# Patient Record
Sex: MALE | Employment: FULL TIME | URBAN - METROPOLITAN AREA
[De-identification: names, ages, dates, MRNs, and addresses within clinical notes are randomized per-mention and may not be internally consistent; named-entity substitution may affect disease eponyms.]

---

## 2024-09-19 ENCOUNTER — OFFICE VISIT (OUTPATIENT)
Dept: OTOLARYNGOLOGY | Facility: CLINIC | Age: 24
End: 2024-09-19
Payer: COMMERCIAL

## 2024-09-19 ENCOUNTER — EVALUATION (OUTPATIENT)
Dept: SPEECH THERAPY | Facility: CLINIC | Age: 24
End: 2024-09-19
Payer: COMMERCIAL

## 2024-09-19 VITALS — WEIGHT: 200 LBS | HEIGHT: 72 IN | TEMPERATURE: 97.1 F | BODY MASS INDEX: 27.09 KG/M2

## 2024-09-19 DIAGNOSIS — R49.8 OTHER VOICE AND RESONANCE DISORDERS: ICD-10-CM

## 2024-09-19 DIAGNOSIS — R49.0 DYSPHONIA: Primary | ICD-10-CM

## 2024-09-19 DIAGNOSIS — J38.3 VOCAL CORD ULCER: ICD-10-CM

## 2024-09-19 DIAGNOSIS — R49.0 MUSCLE TENSION DYSPHONIA: ICD-10-CM

## 2024-09-19 DIAGNOSIS — K21.9 LARYNGOPHARYNGEAL REFLUX (LPR): ICD-10-CM

## 2024-09-19 DIAGNOSIS — R09.A2 GLOBUS PHARYNGEUS: ICD-10-CM

## 2024-09-19 DIAGNOSIS — R49.9 CHANGE IN VOICE: Primary | ICD-10-CM

## 2024-09-19 DIAGNOSIS — M54.2 NECK PAIN: ICD-10-CM

## 2024-09-19 PROCEDURE — 99204 OFFICE O/P NEW MOD 45 MIN: CPT | Performed by: OTOLARYNGOLOGY

## 2024-09-19 PROCEDURE — 31579 LARYNGOSCOPY TELESCOPIC: CPT | Performed by: OTOLARYNGOLOGY

## 2024-09-19 PROCEDURE — 3008F BODY MASS INDEX DOCD: CPT | Performed by: OTOLARYNGOLOGY

## 2024-09-19 PROCEDURE — 92524 BEHAVRAL QUALIT ANALYS VOICE: CPT | Mod: GN | Performed by: SPEECH-LANGUAGE PATHOLOGIST

## 2024-09-19 RX ORDER — DEXTROAMPHETAMINE SULFATE, DEXTROAMPHETAMINE SACCHARATE, AMPHETAMINE SULFATE AND AMPHETAMINE ASPARTATE 2.5; 2.5; 2.5; 2.5 MG/1; MG/1; MG/1; MG/1
1 CAPSULE, EXTENDED RELEASE ORAL
COMMUNITY
Start: 2024-08-23

## 2024-09-19 ASSESSMENT — PAIN - FUNCTIONAL ASSESSMENT: PAIN_FUNCTIONAL_ASSESSMENT: 0-10

## 2024-09-19 ASSESSMENT — PATIENT HEALTH QUESTIONNAIRE - PHQ9
SUM OF ALL RESPONSES TO PHQ9 QUESTIONS 1 AND 2: 0
1. LITTLE INTEREST OR PLEASURE IN DOING THINGS: NOT AT ALL
2. FEELING DOWN, DEPRESSED OR HOPELESS: NOT AT ALL

## 2024-09-19 ASSESSMENT — PAIN SCALES - GENERAL: PAINLEVEL_OUTOF10: 0 - NO PAIN

## 2024-09-19 NOTE — PROGRESS NOTES
ASSESSMENT AND PLAN:   Cooper Cross is a 24 y.o. male with a history of voice changes for ~ 1 month. He had an URI with cough that lead to his symptoms of pain in the left upper larynx with loud phonation and high pitch singing.       Today's examination to include stroboscopy. demonstrates an ulcer on the left medial arytenoid near the vocal process due to significant cough.     We discussed starting reflux mediation. We will consider steroids if he doesn't recover.     I would like to see the patient back on Monday 08/23/2024 @ 8:00 am to determine next steps. He will have a modified voice rest schedule and will see our SLP today.      Reason For Consult  Chief Complaint   Patient presents with    vocal issues     Difficulty swallowing, going on for a month         HISTORY OF PRESENT ILLNESS:  Cooper Cross is a 24 y.o. male presenting for a follow up visit with me for voice changes.  The patient is a lei.     The patient reports an URI with cough 4-5 weeks ago. He has noted pain in the throat with singing and swallowing. This is worse in the morning. He has noticed issues with loud phonation. There were no voice issues prior to this URI.            Past Medical History  He has no past medical history on file. Surgical History  He has no past surgical history on file.   Social History  He reports that he has quit smoking. His smoking use included cigarettes. He has never used smokeless tobacco. No history on file for alcohol use and drug use. Allergies  Patient has no known allergies.     Family History  No family history on file.    Review of Systems  All 10 systems were reviewed and negative except for above.      Last Recorded Vitals  Temperature 36.2 °C (97.1 °F), height 1.829 m (6'), weight 90.7 kg (200 lb).    Physical Exam  ENT Physical Exam  Constitutional  Appearance: patient appears well-developed and well-nourished,  Head and Face  Appearance: head appears normal and face appears  normal;  Ear  Auricles: right auricle normal; left auricle normal;  Nose  External Nose: nares patent bilaterally;  Internal Nose: nasal septal deviation present;  Oral Cavity/Oropharynx  Lips: normal;  Neck  Neck: neck normal; neck palpation normal;  Respiratory  Inspection: no retractions visible;  Cardiovascular  Inspection: no peripheral edema present;  Neurovestibular  Mental Status: alert and oriented;  Psychiatric: mood normal;  Cranial Nerves: cranial nerves intact;        Procedures     Flexible Laryngoscopy w/ Videostroboscopy    VOICE AND SPEECH CHARACTERISTICS:  Normal spoken speech, no dysphonia, no roughness, no breathiness, no asthenia, no strain.    Additional Voice Characteristics: glottal stovall  Intelligibility: normal.   Resonance: balanced.   Vocal Loudness: normal.   Breath Support: normal.    PROCEDURE:    Indications: voice change  PROCEDURE NOTE: FLEXIBLE LARYNGOSCOPY WITH STROBOSCOPY  I recommended a flexible laryngoscopy with stroboscopy based on PE findings, and/or concern for mucosal wave details based upon history and/or for issues associated with hyperreflexic gag on mirror exam concerning for pathology. Risks, benefits, and alternatives were explained. The patient wishes to proceed and gives verbal consent.   Patient is seated in the exam chair. After adequate topical anesthesia, I advance the flexible endoscope. The examination included evaluation of the singh, vallecula, base of tongue, pyriforms, post-cricoid area, larynx and immediate subglottis.  Findings : assessment of the nasopharynx, base of tongue/vallecula, pyriform sinuses, post-cricoid area and pharyngeal walls was without lesion or mass, pharyngeal wall contraction is normal and symmetric, and no pooling of secretions  Reflux finding score: 0/26  (>7 95% significant)  Gross Arytenoid Movement: symmetric.  Arytenoid Height: normal.   Supraglottic Tension: none.  Symmetry: normal.   Amplitude: normal.  Phase Closure:  in-phase.  Mucosal Wave Lateral Excursion/Secondary Wave: Bilateral Vocal Cord: no restriction - wave moved more than ½ the width of the vocal fold.  Periodicity: normal.  Mass: Left arytenoid ulcer.   Closure: closed.    Time Spent  Prep time on day of patient encounter: 10 minutes  Time spent directly with patient, family or caregiver: 15 minutes  Additional Time Spent on Patient Care Activities/Discussion with SLP re care plan: 5 minutes  Documentation Time: 10 minutes  Other Time Spent: 0 minutes  Total: 40 minutes     Scribe Attestation  By signing my name below, Lashon MONK , [employer] attest that this documentation has been prepared under the direction and in the presence of Mitch Whatley MD.

## 2024-09-20 PROBLEM — J38.3: Status: ACTIVE | Noted: 2024-09-20

## 2024-09-20 PROBLEM — R49.0 DYSPHONIA: Status: ACTIVE | Noted: 2024-09-20

## 2024-09-23 ENCOUNTER — OFFICE VISIT (OUTPATIENT)
Dept: OTOLARYNGOLOGY | Facility: HOSPITAL | Age: 24
End: 2024-09-23
Payer: COMMERCIAL

## 2024-09-23 VITALS — WEIGHT: 201 LBS | BODY MASS INDEX: 27.22 KG/M2 | HEIGHT: 72 IN | TEMPERATURE: 97.7 F

## 2024-09-23 DIAGNOSIS — J38.3 VOCAL CORD ULCER: ICD-10-CM

## 2024-09-23 DIAGNOSIS — R49.0 VOICE HOARSENESS: Primary | ICD-10-CM

## 2024-09-23 DIAGNOSIS — R49.8 OTHER VOICE AND RESONANCE DISORDERS: ICD-10-CM

## 2024-09-23 DIAGNOSIS — R49.0 MUSCLE TENSION DYSPHONIA: ICD-10-CM

## 2024-09-23 PROCEDURE — 1036F TOBACCO NON-USER: CPT | Performed by: OTOLARYNGOLOGY

## 2024-09-23 PROCEDURE — 3008F BODY MASS INDEX DOCD: CPT | Performed by: OTOLARYNGOLOGY

## 2024-09-23 PROCEDURE — 99214 OFFICE O/P EST MOD 30 MIN: CPT | Performed by: OTOLARYNGOLOGY

## 2024-09-23 PROCEDURE — 31579 LARYNGOSCOPY TELESCOPIC: CPT | Performed by: OTOLARYNGOLOGY

## 2024-09-23 PROCEDURE — 99214 OFFICE O/P EST MOD 30 MIN: CPT | Mod: 25 | Performed by: OTOLARYNGOLOGY

## 2024-09-23 RX ORDER — METHYLPREDNISOLONE 4 MG/1
TABLET ORAL
Qty: 21 TABLET | Refills: 0 | Status: SHIPPED | OUTPATIENT
Start: 2024-09-23 | End: 2024-09-30

## 2024-09-23 ASSESSMENT — PATIENT HEALTH QUESTIONNAIRE - PHQ9
1. LITTLE INTEREST OR PLEASURE IN DOING THINGS: NOT AT ALL
2. FEELING DOWN, DEPRESSED OR HOPELESS: NOT AT ALL
SUM OF ALL RESPONSES TO PHQ9 QUESTIONS 1 & 2: 0

## 2024-09-23 NOTE — PROGRESS NOTES
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 He noted during the 2 shows this weekend that there was no significant voice breaks but he did have mild fatigue after the performances.  Feels the ulcer is creating some strain as it is uncomfortable to sing.      Today's examination to include stroboscopy shows ulcer on left vocal cord is still present.      We discussed trying a steroid treatment and voice rest over the next several days. We discussed the risk of steroids and overuse of voice. Follow-up prn.     The patient will keep in touch with [facility] our speech therapist as he travels.  May need to see local ENT at new location.          Reason For Consult  Chief Complaint   Patient presents with    Follow-up     [facility]        HISTORY OF PRESENT ILLNESS:  Cooper Cross is a 24 y.o. male presenting for a follow-up visit with me for vocal cord evaluation.      The patient reports no voice changes during recent performances but there is some pain. His voice is rough after performances. He usually has a day off between shows. He notes that he is working daily though. He states that 1 or 2 weeks of voice rest can be arranged if necessary.     The patient is going to the airport directly after today's visit.     Prior History:   Last seen 9/19/24 Assessment and Plan:  Cooper Cross is a 24 y.o. male with a history of voice changes for ~ 1 month. He had an URI with cough that lead to his symptoms of pain in the left upper larynx with loud phonation and high pitch singing.     Today's examination to include stroboscopy. demonstrates an ulcer on the left medial arytenoid near the vocal process due to significant cough.   We discussed starting reflux mediation. We will consider steroids if he doesn't recover.   I would like to see the patient back on Monday 09/23/2024 @ 8:00 am to determine next steps. He will have a modified  voice rest schedule and will see our SLP today.        Past Medical History  He has no past medical history on file. Surgical History  He has no past surgical history on file.   Social History  He reports that he has quit smoking. His smoking use included cigarettes. He started smoking about 4 years ago. He has a 2.4 pack-year smoking history. He has never used smokeless tobacco. No history on file for alcohol use and drug use. Allergies  Patient has no known allergies.     Family History  No family history on file.    Review of Systems  All 10 systems were reviewed and negative except for above.      Last Recorded Vitals  Temperature 36.5 °C (97.7 °F), height 1.829 m (6'), weight 91.2 kg (201 lb).    Physical Exam  ENT Physical Exam   ENT Physical Exam  Constitutional  Appearance: patient appears well-developed and well-nourished,  Head and Face  Appearance: head appears normal and face appears normal;  Ear  Auricles: right auricle normal; left auricle normal;  Nose  External Nose: nares patent bilaterally; right-sided deviated septum  Oral Cavity/Oropharynx  Lips: normal;  Neck  Neck: neck normal; neck palpation normal;  Respiratory  Inspection: no retractions visible;  Cardiovascular  Inspection: no peripheral edema present;  Neurovestibular  Mental Status: alert and oriented;  Psychiatric: mood normal;  Cranial Nerves: cranial nerves intact;     Relevant Results    Procedures   Flexible Laryngoscopy w/ Videostroboscopy    VOICE AND SPEECH CHARACTERISTICS:  Normal spoken speech, no dysphonia, no roughness, no breathiness, no asthenia, no strain.    Intelligibility: normal.   Resonance: balanced.   Vocal Loudness: normal.   Breath Support: normal.    PROCEDURE:    Indications: voice change  PROCEDURE NOTE: FLEXIBLE LARYNGOSCOPY WITH STROBOSCOPY  I recommended a flexible laryngoscopy with stroboscopy based on PE findings, and/or concern for mucosal wave details based upon history and/or for issues associated with  hyperreflexic gag on mirror exam concerning for pathology. Risks, benefits, and alternatives were explained. The patient wishes to proceed and gives verbal consent.   Patient is seated in the exam chair. After adequate topical anesthesia, I advance the flexible endoscope. The examination included evaluation of the singh, vallecula, base of tongue, pyriforms, post-cricoid area, larynx and immediate subglottis.    Reflux Finding Score  Subglottic edema: Absent   Thick Mucus: Absent   Granuloma: Absent   Ventricular Obliteration: Partial   Erythema/Hyperemia: present  IA Thickening: Mild   TVC Edema: Absent   Diffuse Laryngitis: Absent     Gross Arytenoid Movement: symmetric.  Arytenoid Height: normal.   Supraglottic Tension: none.    Symmetry: normal.   Amplitude: normal.  Phase Closure: in-phase.  Periodicity: normal.  Mucosal Wave Lateral Excursion/Secondary Wave: Bilateral Vocal Cord: no restriction - wave moved more than ½ the width of the vocal fold.    Closure: closed.    Time Spent  Prep time on day of patient encounter: 10 minutes  Time spent directly with patient, family or caregiver: 15 minutes  Additional Time Spent on Patient Care Activities/Discussion with SLP re care plan: 5 minutes  Documentation Time: 10 minutes  Other Time Spent: 0 minutes  Total: 40 minutes     Scribe Attestation  By signing my name below, I, Anneliese Denny , [employer]   attest that this documentation has been prepared under the direction and in the presence of Mitch Whatley MD.

## 2024-09-23 NOTE — LETTER
September 23, 2024     Patient: Cooper Cross   YOB: 2000   Date of Visit: 9/23/2024       To Whom It May Concern:    Cooper Cross was seen in my clinic on 9/23/2024 at 8:00 am. Recommended 5 days of voice rest and avoidance of singing.     If you have any questions or concerns, please don't hesitate to call.         Sincerely,         Mitch Whatley MD